# Patient Record
Sex: FEMALE | Race: WHITE | Employment: PART TIME | URBAN - METROPOLITAN AREA
[De-identification: names, ages, dates, MRNs, and addresses within clinical notes are randomized per-mention and may not be internally consistent; named-entity substitution may affect disease eponyms.]

---

## 2023-06-11 ENCOUNTER — HOSPITAL ENCOUNTER (EMERGENCY)
Facility: HOSPITAL | Age: 43
Discharge: HOME | End: 2023-06-11
Attending: EMERGENCY MEDICINE
Payer: COMMERCIAL

## 2023-06-11 VITALS
HEIGHT: 70 IN | DIASTOLIC BLOOD PRESSURE: 81 MMHG | BODY MASS INDEX: 22.9 KG/M2 | TEMPERATURE: 99 F | SYSTOLIC BLOOD PRESSURE: 118 MMHG | HEART RATE: 70 BPM | RESPIRATION RATE: 16 BRPM | OXYGEN SATURATION: 99 % | WEIGHT: 160 LBS

## 2023-06-11 DIAGNOSIS — M54.16 LUMBAR RADICULOPATHY: Primary | ICD-10-CM

## 2023-06-11 PROCEDURE — 96372 THER/PROPH/DIAG INJ SC/IM: CPT

## 2023-06-11 PROCEDURE — 99283 EMERGENCY DEPT VISIT LOW MDM: CPT

## 2023-06-11 PROCEDURE — 99284 EMERGENCY DEPT VISIT MOD MDM: CPT

## 2023-06-11 RX ORDER — KETOROLAC TROMETHAMINE 30 MG/ML
30 INJECTION, SOLUTION INTRAMUSCULAR; INTRAVENOUS ONCE
Status: COMPLETED | OUTPATIENT
Start: 2023-06-11 | End: 2023-06-11

## 2023-06-11 RX ORDER — METHYLPREDNISOLONE 4 MG/1
TABLET ORAL
Qty: 1 EACH | Refills: 0 | Status: SHIPPED | OUTPATIENT
Start: 2023-06-11

## 2023-06-11 RX ORDER — KETOROLAC TROMETHAMINE 10 MG/1
10 TABLET, FILM COATED ORAL EVERY 6 HOURS PRN
Qty: 10 TABLET | Refills: 0 | Status: SHIPPED | OUTPATIENT
Start: 2023-06-11 | End: 2023-06-11

## 2023-06-11 RX ORDER — KETOROLAC TROMETHAMINE 10 MG/1
10 TABLET, FILM COATED ORAL EVERY 6 HOURS PRN
Qty: 10 TABLET | Refills: 0 | Status: SHIPPED | OUTPATIENT
Start: 2023-06-11 | End: 2023-06-18

## 2023-06-11 RX ORDER — HYDROCODONE BITARTRATE AND ACETAMINOPHEN 5; 325 MG/1; MG/1
1 TABLET ORAL EVERY 6 HOURS PRN
Qty: 10 TABLET | Refills: 0 | Status: SHIPPED | OUTPATIENT
Start: 2023-06-11 | End: 2023-06-18

## 2023-06-11 NOTE — ED INITIAL ASSESSMENT (HPI)
Patient arrives with complaint of lower back pain with numbness to right leg x 1 week. Prescribed PT from PCP for back pain and had numbness start 1 day after. Denies loss of bowel/bladder; denies urinary symptoms.

## 2023-12-07 ENCOUNTER — ORDER TRANSCRIPTION (OUTPATIENT)
Dept: SLEEP CENTER | Age: 43
End: 2023-12-07

## 2023-12-07 DIAGNOSIS — R35.1 NOCTURIA: Primary | ICD-10-CM

## 2023-12-20 ENCOUNTER — ORDER TRANSCRIPTION (OUTPATIENT)
Dept: SLEEP CENTER | Age: 43
End: 2023-12-20

## 2023-12-20 DIAGNOSIS — R35.1 NOCTURIA: ICD-10-CM

## 2024-02-22 ENCOUNTER — TELEPHONE (OUTPATIENT)
Dept: SURGERY | Age: 44
End: 2024-02-22

## 2024-03-07 ENCOUNTER — OFFICE VISIT (OUTPATIENT)
Dept: SURGERY | Age: 44
End: 2024-03-07
Attending: SURGERY

## 2024-03-07 VITALS
SYSTOLIC BLOOD PRESSURE: 106 MMHG | RESPIRATION RATE: 16 BRPM | TEMPERATURE: 97.9 F | OXYGEN SATURATION: 100 % | DIASTOLIC BLOOD PRESSURE: 80 MMHG | HEART RATE: 70 BPM | HEIGHT: 70 IN | WEIGHT: 167 LBS | BODY MASS INDEX: 23.91 KG/M2

## 2024-03-07 DIAGNOSIS — M62.89 PELVIC FLOOR DYSFUNCTION: Primary | ICD-10-CM

## 2024-03-07 PROCEDURE — 99204 OFFICE O/P NEW MOD 45 MIN: CPT | Performed by: SURGERY

## 2024-03-07 RX ORDER — LEVOTHYROXINE SODIUM 0.1 MG/1
1 TABLET ORAL DAILY
COMMUNITY

## 2024-03-07 RX ORDER — POLYETHYLENE GLYCOL 3350 17 G/17G
POWDER, FOR SOLUTION ORAL
COMMUNITY

## 2024-03-07 ASSESSMENT — ENCOUNTER SYMPTOMS
NAUSEA: 0
DIARRHEA: 0
ABDOMINAL PAIN: 0
RECTAL PAIN: 0
NEUROLOGICAL NEGATIVE: 1
ENDOCRINE NEGATIVE: 1
EYES NEGATIVE: 1
ABDOMINAL DISTENTION: 0
RESPIRATORY NEGATIVE: 1
PSYCHIATRIC NEGATIVE: 1
VOMITING: 0
BLOOD IN STOOL: 0
CONSTIPATION: 1
CONSTITUTIONAL NEGATIVE: 1
ANAL BLEEDING: 0

## 2024-03-07 ASSESSMENT — PAIN SCALES - GENERAL: PAINLEVEL: 0

## 2024-03-15 ENCOUNTER — HOSPITAL ENCOUNTER (EMERGENCY)
Facility: HOSPITAL | Age: 44
Discharge: HOME | End: 2024-03-15
Attending: EMERGENCY MEDICINE
Payer: COMMERCIAL

## 2024-03-15 ENCOUNTER — APPOINTMENT (OUTPATIENT)
Dept: CT IMAGING | Facility: HOSPITAL | Age: 44
End: 2024-03-15
Attending: EMERGENCY MEDICINE
Payer: COMMERCIAL

## 2024-03-15 VITALS
HEIGHT: 70 IN | BODY MASS INDEX: 23.62 KG/M2 | OXYGEN SATURATION: 97 % | TEMPERATURE: 100 F | DIASTOLIC BLOOD PRESSURE: 64 MMHG | HEART RATE: 84 BPM | SYSTOLIC BLOOD PRESSURE: 107 MMHG | WEIGHT: 165 LBS | RESPIRATION RATE: 18 BRPM

## 2024-03-15 DIAGNOSIS — R11.2 NAUSEA AND VOMITING IN ADULT: Primary | ICD-10-CM

## 2024-03-15 LAB
ALBUMIN SERPL-MCNC: 4.2 G/DL (ref 3.2–4.8)
ALP LIVER SERPL-CCNC: 50 U/L
ALT SERPL-CCNC: 16 U/L
ANION GAP SERPL CALC-SCNC: 6 MMOL/L (ref 0–18)
AST SERPL-CCNC: 18 U/L (ref ?–34)
B-HCG UR QL: NEGATIVE
BASOPHILS # BLD AUTO: 0.03 X10(3) UL (ref 0–0.2)
BASOPHILS NFR BLD AUTO: 0.2 %
BILIRUB DIRECT SERPL-MCNC: 0.4 MG/DL (ref ?–0.3)
BILIRUB SERPL-MCNC: 1.1 MG/DL (ref 0.3–1.2)
BILIRUB UR QL: NEGATIVE
BUN BLD-MCNC: 15 MG/DL (ref 9–23)
BUN/CREAT SERPL: 20 (ref 10–20)
CALCIUM BLD-MCNC: 9 MG/DL (ref 8.7–10.4)
CHLORIDE SERPL-SCNC: 107 MMOL/L (ref 98–112)
CLARITY UR: CLEAR
CO2 SERPL-SCNC: 27 MMOL/L (ref 21–32)
COLOR UR: YELLOW
CREAT BLD-MCNC: 0.75 MG/DL
DEPRECATED RDW RBC AUTO: 38 FL (ref 35.1–46.3)
EGFRCR SERPLBLD CKD-EPI 2021: 103 ML/MIN/1.73M2 (ref 60–?)
EOSINOPHIL # BLD AUTO: 0.03 X10(3) UL (ref 0–0.7)
EOSINOPHIL NFR BLD AUTO: 0.2 %
ERYTHROCYTE [DISTWIDTH] IN BLOOD BY AUTOMATED COUNT: 11.1 % (ref 11–15)
GLUCOSE BLD-MCNC: 88 MG/DL (ref 70–99)
GLUCOSE UR-MCNC: NORMAL MG/DL
HCT VFR BLD AUTO: 40.6 %
HGB BLD-MCNC: 14.2 G/DL
HGB UR QL STRIP.AUTO: NEGATIVE
IMM GRANULOCYTES # BLD AUTO: 0.03 X10(3) UL (ref 0–1)
IMM GRANULOCYTES NFR BLD: 0.2 %
KETONES UR-MCNC: 20 MG/DL
LEUKOCYTE ESTERASE UR QL STRIP.AUTO: 75
LYMPHOCYTES # BLD AUTO: 0.27 X10(3) UL (ref 1–4)
LYMPHOCYTES NFR BLD AUTO: 2.2 %
MCH RBC QN AUTO: 32.2 PG (ref 26–34)
MCHC RBC AUTO-ENTMCNC: 35 G/DL (ref 31–37)
MCV RBC AUTO: 92.1 FL
MONOCYTES # BLD AUTO: 0.43 X10(3) UL (ref 0.1–1)
MONOCYTES NFR BLD AUTO: 3.5 %
NEUTROPHILS # BLD AUTO: 11.46 X10 (3) UL (ref 1.5–7.7)
NEUTROPHILS # BLD AUTO: 11.46 X10(3) UL (ref 1.5–7.7)
NEUTROPHILS NFR BLD AUTO: 93.7 %
NITRITE UR QL STRIP.AUTO: NEGATIVE
OSMOLALITY SERPL CALC.SUM OF ELEC: 290 MOSM/KG (ref 275–295)
PH UR: 6.5 [PH] (ref 5–8)
PLATELET # BLD AUTO: 193 10(3)UL (ref 150–450)
POTASSIUM SERPL-SCNC: 3.9 MMOL/L (ref 3.5–5.1)
PROT SERPL-MCNC: 6.6 G/DL (ref 5.7–8.2)
PROT UR-MCNC: 20 MG/DL
RBC # BLD AUTO: 4.41 X10(6)UL
SODIUM SERPL-SCNC: 140 MMOL/L (ref 136–145)
SP GR UR STRIP: 1.03 (ref 1–1.03)
UROBILINOGEN UR STRIP-ACNC: NORMAL
WBC # BLD AUTO: 12.3 X10(3) UL (ref 4–11)

## 2024-03-15 PROCEDURE — 74177 CT ABD & PELVIS W/CONTRAST: CPT | Performed by: EMERGENCY MEDICINE

## 2024-03-15 PROCEDURE — 96360 HYDRATION IV INFUSION INIT: CPT

## 2024-03-15 PROCEDURE — 99285 EMERGENCY DEPT VISIT HI MDM: CPT

## 2024-03-15 PROCEDURE — 81025 URINE PREGNANCY TEST: CPT

## 2024-03-15 PROCEDURE — 85025 COMPLETE CBC W/AUTO DIFF WBC: CPT | Performed by: EMERGENCY MEDICINE

## 2024-03-15 PROCEDURE — 81001 URINALYSIS AUTO W/SCOPE: CPT | Performed by: EMERGENCY MEDICINE

## 2024-03-15 PROCEDURE — 96361 HYDRATE IV INFUSION ADD-ON: CPT

## 2024-03-15 PROCEDURE — 80048 BASIC METABOLIC PNL TOTAL CA: CPT | Performed by: EMERGENCY MEDICINE

## 2024-03-15 PROCEDURE — 99284 EMERGENCY DEPT VISIT MOD MDM: CPT

## 2024-03-15 PROCEDURE — 80076 HEPATIC FUNCTION PANEL: CPT | Performed by: EMERGENCY MEDICINE

## 2024-03-15 RX ORDER — ACETAMINOPHEN 500 MG
1000 TABLET ORAL ONCE
Status: COMPLETED | OUTPATIENT
Start: 2024-03-15 | End: 2024-03-15

## 2024-03-15 RX ORDER — ONDANSETRON 4 MG/1
4 TABLET, ORALLY DISINTEGRATING ORAL EVERY 4 HOURS PRN
Qty: 10 TABLET | Refills: 0 | Status: SHIPPED | OUTPATIENT
Start: 2024-03-15 | End: 2024-03-22

## 2024-03-15 RX ORDER — ONDANSETRON 2 MG/ML
4 INJECTION INTRAMUSCULAR; INTRAVENOUS ONCE
Status: COMPLETED | OUTPATIENT
Start: 2024-03-15 | End: 2024-03-15

## 2024-03-15 NOTE — ED PROVIDER NOTES
Patient Seen in: [facility] Emergency Department      History     Chief Complaint   Patient presents with    Nausea/Vomiting/Diarrhea    Fever     Stated Complaint: n/v fever    Subjective:   HPI    Patient is a 43-year-old female who states she has not felt well since about 5 AM.  She complains of nausea vomiting chills and mild lower abdominal pain.  No diarrhea no urinary symptoms last menstrual period was a year ago she has an IUD.    Objective:   Past Medical History:   Diagnosis Date    Hypothyroid               Past Surgical History:   Procedure Laterality Date    ANTERIOR CRUCIATE LIGAMENT REPAIR                  Social History     Socioeconomic History    Marital status:    Tobacco Use    Smoking status: Never    Smokeless tobacco: Never   Vaping Use    Vaping Use: Never used   Substance and Sexual Activity    Alcohol use: Yes     Comment: occasionally    Drug use: Not Currently              Review of Systems    Positive for stated complaint: n/v fever  Other systems are as noted in HPI.  Constitutional and vital signs reviewed.      All other systems reviewed and negative except as noted above.    Physical Exam     ED Triage Vitals [03/15/24 1244]   /76   Pulse 95   Resp 18   Temp 98 °F (36.7 °C)   Temp src Oral   SpO2 98 %   O2 Device None (Room air)       Current:/64   Pulse 84   Temp 100.1 °F (37.8 °C) (Temporal)   Resp 18   Ht 177.8 cm (5' 10\")   Wt 74.8 kg   SpO2 97%   BMI 23.68 kg/m²         Physical Exam    Constitutional: Oriented to person, place, and time. Appears well-developed and well-nourished.   HEENT:   Head: Normocephalic and atraumatic.   Right Ear: External ear normal.   Left Ear: External ear normal.   Nose: Nose normal.   Mouth/Throat: Oropharynx is clear and moist.   Eyes: Conjunctivae and EOM are normal. Pupils are equal, round, and reactive to light.   Neck: Neck supple.   Cardiovascular: Normal rate, regular rhythm, normal heart sounds and intact  distal pulses.    Pulmonary/Chest: Effort normal and breath sounds normal. No respiratory distress.   Abdominal: Soft. Bowel sounds are normal. Exhibits no distension and no mass. There is right lower quadrant tenderness. There is no rebound and no guarding.   Musculoskeletal: Normal range of motion. Exhibits no edema or tenderness.   Lymphadenopathy: No cervical adenopathy.   Neurological: Alert and oriented to person, place, and time. Normal reflexes. No cranial nerve deficit. No motor os sensory defecits noted Coordination normal.   Skin: Skin is warm and dry.   Psychiatric: Normal mood and affect. Behavior is normal. Judgment and thought content normal.   Nursing note and vitals reviewed.        ED Course     Labs Reviewed   HEPATIC FUNCTION PANEL (7) - Abnormal; Notable for the following components:       Result Value    Bilirubin, Direct 0.4 (*)     All other components within normal limits   URINALYSIS, ROUTINE - Abnormal; Notable for the following components:    Ketones Urine 20 (*)     Protein Urine 20 (*)     Leukocyte Esterase Urine 75 (*)     RBC Urine 3-5 (*)     Bacteria Urine Rare (*)     Squamous Epi. Cells Few (*)     All other components within normal limits   CBC W/ DIFFERENTIAL - Abnormal; Notable for the following components:    WBC 12.3 (*)     Neutrophil Absolute Prelim 11.46 (*)     Neutrophil Absolute 11.46 (*)     Lymphocyte Absolute 0.27 (*)     All other components within normal limits   BASIC METABOLIC PANEL (8) - Normal   POCT PREGNANCY URINE - Normal   CBC WITH DIFFERENTIAL WITH PLATELET    Narrative:     The following orders were created for panel order CBC With Differential With Platelet.  Procedure                               Abnormality         Status                     ---------                               -----------         ------                     CBC W/ DIFFERENTIAL[[id number]]          Abnormal            Final result                 Please view results for these tests  on the individual orders.                      MDM      Use of independent historian: Patient's  helps with history feels well    I personally reviewed and interpreted the images : No hydronephrosis no evidence of appendicitis    CT ABDOMEN+PELVIS(CONTRAST ONLY)(CPT=74177)    Result Date: 3/15/2024  CONCLUSION:   No acute abnormality in the abdomen or pelvis.  Well-positioned intrauterine device.  Lesser incidental findings described above.     Dictated by (CST): Manuelito Vick MD on 3/15/2024 at 5:04 PM     Finalized by (CST): Manuelito Vcik MD on 3/15/2024 at 5:08 PM           Vitals:    03/15/24 1541 03/15/24 1600 03/15/24 1645 03/15/24 1656   BP:  104/61 107/64    Pulse:  92 84    Resp:       Temp: (!) 101.3 °F (38.5 °C)   100.1 °F (37.8 °C)   TempSrc: Temporal   Temporal   SpO2:  98% 97%    Weight:       Height:         *I personally reviewed and interpreted all ED vitals.    Pulse Ox: 98%, Room air, Normal         Differential Diagnosis/ Diagnostic Considerations: Patient with nausea vomiting low-grade fevers by history and right lower quadrant pain consider appendicitis consider gastroenteritis consider diverticulitis    Medical Record Review: I personally reviewed available prior medical records for any recent pertinent discharge summaries, testing, and procedures and reviewed those reports and found .    Complicating Factors: The patient already has  which contribute to the complexity of this ED evaluation.    Social determinants of health:    Prescription drug management:      Shared Decision Making:    ED Course: Workup findings shared with patient no further vomiting will discharge home.  Warning signs and return to discussed    Discussion of management with other healthcare providers:    Condition upon leaving the department: Stable                                     Medical Decision Making      Disposition and Plan     Clinical Impression:  1. Nausea and vomiting in adult          Disposition:  Discharge  3/15/2024  5:38 pm    Follow-up:  Leon Hernandez MD  [address] [place] [address]  [phone]    Follow up            Medications Prescribed:  Current Discharge Medication List        START taking these medications    Details   ondansetron 4 MG Oral Tablet Dispersible Take 1 tablet (4 mg total) by mouth every 4 (four) hours as needed for Nausea.  Qty: 10 tablet, Refills: 0

## 2024-03-15 NOTE — ED QUICK NOTES
Pt A&OX4, Discharge paperwork, prescription, and follow-up discussed with pt, pt verbally understands them. Pt discharged ambulatory with steady gait.

## 2024-04-03 ENCOUNTER — EXTERNAL RECORD (OUTPATIENT)
Dept: HEALTH INFORMATION MANAGEMENT | Facility: OTHER | Age: 44
End: 2024-04-03

## 2024-04-04 ENCOUNTER — HOSPITAL ENCOUNTER (OUTPATIENT)
Dept: GASTROENTEROLOGY | Age: 44
Discharge: HOME | End: 2024-04-04
Attending: SURGERY

## 2024-04-04 DIAGNOSIS — M62.89 PELVIC FLOOR DYSFUNCTION: ICD-10-CM

## 2024-04-04 PROCEDURE — 10006023 HB SUPPLY 272

## 2024-04-04 PROCEDURE — 13000022 HB GI BASIC CASE  S/U +1ST 15 MIN

## 2024-04-04 PROCEDURE — 13000023 HB GI BASIC CASE EA ADD MINUTE

## 2024-04-04 PROCEDURE — 91122 ANAL PRESSURE RECORD: CPT | Performed by: SURGERY

## 2024-04-16 ENCOUNTER — TELEPHONE (OUTPATIENT)
Dept: SURGERY | Age: 44
End: 2024-04-16

## 2024-04-18 ENCOUNTER — CLINICAL ABSTRACT (OUTPATIENT)
Dept: HEALTH INFORMATION MANAGEMENT | Facility: OTHER | Age: 44
End: 2024-04-18

## 2024-04-18 ENCOUNTER — V-VISIT (OUTPATIENT)
Dept: SURGERY | Age: 44
End: 2024-04-18
Attending: SURGERY

## 2024-04-18 DIAGNOSIS — M62.89 PELVIC FLOOR DYSFUNCTION: Primary | ICD-10-CM

## 2024-04-18 PROCEDURE — 99213 OFFICE O/P EST LOW 20 MIN: CPT | Performed by: SURGERY

## 2024-04-18 ASSESSMENT — ENCOUNTER SYMPTOMS
PSYCHIATRIC NEGATIVE: 1
GASTROINTESTINAL NEGATIVE: 1
EYES NEGATIVE: 1
NEUROLOGICAL NEGATIVE: 1
CONSTITUTIONAL NEGATIVE: 1
ENDOCRINE NEGATIVE: 1
RESPIRATORY NEGATIVE: 1

## 2024-05-29 ENCOUNTER — OFFICE VISIT (OUTPATIENT)
Dept: SLEEP CENTER | Age: 44
End: 2024-05-29
Attending: FAMILY MEDICINE

## 2024-05-29 DIAGNOSIS — R35.1 NOCTURIA: ICD-10-CM

## 2024-05-29 PROCEDURE — 95806 SLEEP STUDY UNATT&RESP EFFT: CPT

## 2024-06-03 NOTE — PROCEDURES
[facility]  Accredited by the American Academy of Sleep Medicine (AASM)    PATIENT'S NAME: ANIYA MACIAS   ATTENDING PHYSICIAN: Delilah Cast MD   REFERRING PHYSICIAN: Delilah Cast MD   PATIENT ACCOUNT #: [id number] LOCATION: Sleep Center   MEDICAL RECORD #: [id number] YOB: 1980   DATE OF STUDY: 05/29/2024       SLEEP STUDY REPORT    STUDY TYPE:  Home sleep test.    INDICATION:  Suspected obstructive sleep apnea (ICD-10 code G47.33), in a patient with snoring, nocturnal awakening, nocturia, a family history of sleep apnea, an [place] Sleepiness Scale score of 0/24, and a body mass index of 23.9.    RESULTS:  The patient underwent home sleep test with measurement of her nasal airflow, nasal air pressure, snoring, chest and abdominal wall motion, oximetry, and body position.  I have reviewed the entirety of the raw data of this study.  During the study, the total recording time is 536 minutes.  The lights-out clock time is 11:31 p.m.; the lights-on clock time is 8:26 a.m.  The apnea plus hypopnea index is 2.6 events per hour.  The supine apnea plus hypopnea index is 3.5 events per hour.  The average oxygen saturation is 96%, the lowest oxygen saturation is 94%, and the patient spent 0% of the test with saturations 88% or less.  The average heart rate is 65 beats per minute, and the patient spent approximately 15% of the test in the supine position.    INTERPRETATION:  The data generated from this study shows no evidence of significant sleep-disordered breathing.  Snoring was appreciated.    RECOMMENDATIONS:  1.   Clinical correlation is required.  2.   Avoid alcohol.  3.   Avoid sedating drug.  4.   Patient should not drive if at all sleepy.    Please do not hesitate to contact me if there is any question whatsoever regarding interpretation of this study.    Dictated By Sylvester Thakkar MD  d: 05/31/2024 16:27:28  t: 05/31/2024 17:47:21  [facility] [id number]  [facility]/    cc:   Delilah Cast